# Patient Record
Sex: FEMALE | URBAN - METROPOLITAN AREA
[De-identification: names, ages, dates, MRNs, and addresses within clinical notes are randomized per-mention and may not be internally consistent; named-entity substitution may affect disease eponyms.]

---

## 2023-03-27 ENCOUNTER — APPOINTMENT (RX ONLY)
Dept: URBAN - METROPOLITAN AREA CLINIC 151 | Facility: CLINIC | Age: 63
Setting detail: DERMATOLOGY
End: 2023-03-27

## 2023-03-27 DIAGNOSIS — L40.0 PSORIASIS VULGARIS: ICD-10-CM

## 2023-03-27 DIAGNOSIS — L30.4 ERYTHEMA INTERTRIGO: ICD-10-CM

## 2023-03-27 PROCEDURE — ? PRESCRIPTION

## 2023-03-27 PROCEDURE — ? DIAGNOSIS COMMENT

## 2023-03-27 PROCEDURE — ? COUNSELING

## 2023-03-27 PROCEDURE — 99204 OFFICE O/P NEW MOD 45 MIN: CPT

## 2023-03-27 PROCEDURE — ? PRESCRIPTION MEDICATION MANAGEMENT

## 2023-03-27 RX ORDER — FLUCONAZOLE 150 MG/1
TABLET ORAL
Qty: 5 | Refills: 0 | Status: ERX | COMMUNITY
Start: 2023-03-27

## 2023-03-27 RX ORDER — TAPINAROF 10 MG/1000MG
CREAM TOPICAL
Qty: 60 | Refills: 3 | Status: ERX | COMMUNITY
Start: 2023-03-27

## 2023-03-27 RX ORDER — ECONAZOLE NITRATE 10 MG/G
CREAM TOPICAL
Qty: 85 | Refills: 2 | Status: ERX | COMMUNITY
Start: 2023-03-27

## 2023-03-27 RX ORDER — MUPIROCIN 20 MG/G
OINTMENT TOPICAL
Qty: 15 | Refills: 2 | Status: ERX | COMMUNITY
Start: 2023-03-27

## 2023-03-27 RX ADMIN — ECONAZOLE NITRATE: 10 CREAM TOPICAL at 00:00

## 2023-03-27 RX ADMIN — TAPINAROF: 10 CREAM TOPICAL at 00:00

## 2023-03-27 RX ADMIN — MUPIROCIN: 20 OINTMENT TOPICAL at 00:00

## 2023-03-27 RX ADMIN — FLUCONAZOLE: 150 TABLET ORAL at 00:00

## 2023-03-27 NOTE — PROCEDURE: DIAGNOSIS COMMENT
Comment: Counseled pt on intertrigo under the breats, pt to begin begin econazole 1 % topical cream, Diflucan 150 mg tablet; one QD x 5 days, recommended OTC Carpe for maintenance at night. Counseled pt on PSO Vtama 1 % topical cream. Rx sent for Mupirocin to apply to wounds.
Render Risk Assessment In Note?: no
Detail Level: Simple

## 2023-03-27 NOTE — PROCEDURE: PRESCRIPTION MEDICATION MANAGEMENT
Initiate Treatment: Vtama 1 % topical cream
Detail Level: Zone
Render In Strict Bullet Format?: No
Initiate Treatment: econazole 1 % topical cream \\nDiflucan 150 mg tablet; one QD x 5 days

## 2023-03-27 NOTE — HPI: OTHER
Condition:: Inverse PSO
Please Describe Your Condition:: is a new patient who is being seen for a chief complaint of Inverse PSO. Pt notes she was officially diagnosed by another dermatologist when the rash was under the breasts, when she saw her GI doc they also suggested that her rash is PSO on her anus. She gets itchy rashes especially in the summer. Her PCP gave her Ketoconazole for under the breast and Clobetasol for areas affected by PSO. \\nUnder the breast is bright red and speckled. Her rash that she believes is PSO is patchy and red. \\nShe has had two knee replacements but has never been diagnosis with psoriatic arthritis.\\nShe is not using anything on her anus but tried to keep it clean.